# Patient Record
Sex: MALE | Race: WHITE | NOT HISPANIC OR LATINO | URBAN - METROPOLITAN AREA
[De-identification: names, ages, dates, MRNs, and addresses within clinical notes are randomized per-mention and may not be internally consistent; named-entity substitution may affect disease eponyms.]

---

## 2017-03-21 ENCOUNTER — OFFICE (OUTPATIENT)
Dept: URBAN - METROPOLITAN AREA CLINIC 78 | Facility: CLINIC | Age: 62
End: 2017-03-21

## 2017-03-21 VITALS
WEIGHT: 222 LBS | DIASTOLIC BLOOD PRESSURE: 82 MMHG | HEART RATE: 66 BPM | HEIGHT: 71 IN | SYSTOLIC BLOOD PRESSURE: 124 MMHG | TEMPERATURE: 98.4 F

## 2017-03-21 DIAGNOSIS — Z79.01 LONG TERM (CURRENT) USE OF ANTICOAGULANTS: ICD-10-CM

## 2017-03-21 DIAGNOSIS — K21.9 GASTRO-ESOPHAGEAL REFLUX DISEASE WITHOUT ESOPHAGITIS: ICD-10-CM

## 2017-03-21 PROCEDURE — 99214 OFFICE O/P EST MOD 30 MIN: CPT

## 2017-03-21 RX ORDER — OMEPRAZOLE 20 MG/1
CAPSULE, DELAYED RELEASE ORAL
Qty: 90 | Refills: 0 | Status: ACTIVE
Start: 2017-03-21

## 2017-03-21 NOTE — SERVICEHPINOTES
61 yo white male presents with GERD. He notes long h/o GERD (20+ years) and was taking omeprazole OTC but was advised to stop this at recommendation of his PCP. When he stopped taking it he had bad acid reflux, so after a couple of months he started back on this which has resolved his symptoms. He currently takes one pill per day and denies heartburn, vomiting, dysphagia, abdominal pain, or any other concerns. He had an EGD in 2009 with Dr. Godoy which showed appearance of Boyer's esophagus but biopsies were negative per patient. 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 He does report negative w/u by hematology in past. He denies any h/o heart disease.

## 2021-01-27 ENCOUNTER — OFFICE (OUTPATIENT)
Dept: URBAN - METROPOLITAN AREA CLINIC 79 | Facility: CLINIC | Age: 66
End: 2021-01-27

## 2021-01-27 VITALS
HEIGHT: 71 IN | DIASTOLIC BLOOD PRESSURE: 66 MMHG | SYSTOLIC BLOOD PRESSURE: 129 MMHG | WEIGHT: 198.2 LBS | TEMPERATURE: 97.1 F | HEART RATE: 62 BPM

## 2021-01-27 DIAGNOSIS — K21.9 GASTRO-ESOPHAGEAL REFLUX DISEASE WITHOUT ESOPHAGITIS: ICD-10-CM

## 2021-01-27 DIAGNOSIS — K59.09 OTHER CONSTIPATION: ICD-10-CM

## 2021-01-27 DIAGNOSIS — K63.5 POLYP OF COLON: ICD-10-CM

## 2021-01-27 DIAGNOSIS — Z79.01 LONG TERM (CURRENT) USE OF ANTICOAGULANTS: ICD-10-CM

## 2021-01-27 PROCEDURE — 99204 OFFICE O/P NEW MOD 45 MIN: CPT | Performed by: NURSE PRACTITIONER

## 2021-01-27 NOTE — SERVICEHPINOTES
ROCHELLE LOVE   is a   66   male who presents with constipation. Has been experiencing constipation for a couple of years. + Hemorrhoids flared up which has come down now. Over due for colonoscopy. Tring to drink more fluids. Moves bowel every 3 days. Denies blood in stool, melena, abdominal pain or unintentional weight loss.BRDenies nausea, vomiting, dysphagia, epigastric pain or early satiety.  BR+ Acid reflux, longstanding hx over 25 years. Takes omeprazole 20 mg daily.Denies family hx of colon cancer. BRHx of DVTs in [id number], has been taking Xarelto.BR